# Patient Record
Sex: MALE | Race: WHITE
[De-identification: names, ages, dates, MRNs, and addresses within clinical notes are randomized per-mention and may not be internally consistent; named-entity substitution may affect disease eponyms.]

---

## 2017-10-25 ENCOUNTER — HOSPITAL ENCOUNTER (EMERGENCY)
Dept: HOSPITAL 62 - ER | Age: 42
Discharge: HOME | End: 2017-10-25
Payer: SELF-PAY

## 2017-10-25 VITALS — DIASTOLIC BLOOD PRESSURE: 80 MMHG | SYSTOLIC BLOOD PRESSURE: 120 MMHG

## 2017-10-25 DIAGNOSIS — F17.210: ICD-10-CM

## 2017-10-25 DIAGNOSIS — R07.89: Primary | ICD-10-CM

## 2017-10-25 LAB
ALBUMIN SERPL-MCNC: 4.4 G/DL (ref 3.5–5)
ALP SERPL-CCNC: 43 U/L (ref 38–126)
ALT SERPL-CCNC: 37 U/L (ref 21–72)
ANION GAP SERPL CALC-SCNC: 11 MMOL/L (ref 5–19)
AST SERPL-CCNC: 25 U/L (ref 17–59)
BASOPHILS # BLD AUTO: 0.1 10^3/UL (ref 0–0.2)
BASOPHILS NFR BLD AUTO: 1.1 % (ref 0–2)
BILIRUB DIRECT SERPL-MCNC: 0.3 MG/DL (ref 0–0.4)
BILIRUB SERPL-MCNC: 0.4 MG/DL (ref 0.2–1.3)
BUN SERPL-MCNC: 12 MG/DL (ref 7–20)
CALCIUM: 9.6 MG/DL (ref 8.4–10.2)
CHLORIDE SERPL-SCNC: 105 MMOL/L (ref 98–107)
CO2 SERPL-SCNC: 29 MMOL/L (ref 22–30)
CREAT SERPL-MCNC: 1 MG/DL (ref 0.52–1.25)
EOSINOPHIL # BLD AUTO: 0.5 10^3/UL (ref 0–0.6)
EOSINOPHIL NFR BLD AUTO: 6.8 % (ref 0–6)
ERYTHROCYTE [DISTWIDTH] IN BLOOD BY AUTOMATED COUNT: 13.6 % (ref 11.5–14)
GLUCOSE SERPL-MCNC: 82 MG/DL (ref 75–110)
HCT VFR BLD CALC: 44.1 % (ref 37.9–51)
HGB BLD-MCNC: 15.7 G/DL (ref 13.5–17)
HGB HCT DIFFERENCE: 3
LYMPHOCYTES # BLD AUTO: 2.6 10^3/UL (ref 0.5–4.7)
LYMPHOCYTES NFR BLD AUTO: 32.7 % (ref 13–45)
MCH RBC QN AUTO: 32.5 PG (ref 27–33.4)
MCHC RBC AUTO-ENTMCNC: 35.5 G/DL (ref 32–36)
MCV RBC AUTO: 92 FL (ref 80–97)
MONOCYTES # BLD AUTO: 1.1 10^3/UL (ref 0.1–1.4)
MONOCYTES NFR BLD AUTO: 13.5 % (ref 3–13)
NEUTROPHILS # BLD AUTO: 3.6 10^3/UL (ref 1.7–8.2)
NEUTS SEG NFR BLD AUTO: 45.9 % (ref 42–78)
POTASSIUM SERPL-SCNC: 5.1 MMOL/L (ref 3.6–5)
PROT SERPL-MCNC: 7.4 G/DL (ref 6.3–8.2)
RBC # BLD AUTO: 4.81 10^6/UL (ref 4.35–5.55)
SODIUM SERPL-SCNC: 144.9 MMOL/L (ref 137–145)
WBC # BLD AUTO: 7.9 10^3/UL (ref 4–10.5)

## 2017-10-25 PROCEDURE — 80053 COMPREHEN METABOLIC PANEL: CPT

## 2017-10-25 PROCEDURE — 85025 COMPLETE CBC W/AUTO DIFF WBC: CPT

## 2017-10-25 PROCEDURE — 71020: CPT

## 2017-10-25 PROCEDURE — 99285 EMERGENCY DEPT VISIT HI MDM: CPT

## 2017-10-25 PROCEDURE — 84484 ASSAY OF TROPONIN QUANT: CPT

## 2017-10-25 PROCEDURE — 36415 COLL VENOUS BLD VENIPUNCTURE: CPT

## 2017-10-25 PROCEDURE — 93005 ELECTROCARDIOGRAM TRACING: CPT

## 2017-10-25 PROCEDURE — 93010 ELECTROCARDIOGRAM REPORT: CPT

## 2017-10-25 NOTE — RADIOLOGY REPORT (SQ)
EXAM DESCRIPTION:  CHEST PA/LAT



COMPLETED DATE/TIME:  10/25/2017 4:05 pm



REASON FOR STUDY:  pain



COMPARISON:  None.



EXAM PARAMETERS:  NUMBER OF VIEWS: two views

TECHNIQUE: Digital Frontal and Lateral radiographic views of the chest acquired.

RADIATION DOSE: NA

LIMITATIONS: none



FINDINGS:  LUNGS AND PLEURA: No opacities, masses or pneumothorax. No pleural effusion.

MEDIASTINUM AND HILAR STRUCTURES: No masses or contour abnormalities.

HEART AND VASCULAR STRUCTURES: Heart normal size.  No evidence for failure.

BONES: No acute findings.

HARDWARE: None in the chest.

OTHER: No other significant finding.



IMPRESSION:  NO SIGNIFICANT RADIOGRAPHIC FINDING IN THE CHEST.



TECHNICAL DOCUMENTATION:  JOB ID:  [id number]

 2011 [employer]- All Rights Reserved

## 2017-10-25 NOTE — ER DOCUMENT REPORT
ED General





- General


Chief Complaint: Chest Pain > 30


Stated Complaint: CHEST PAIN


Time Seen by Provider: 10/25/17 15:29


Mode of Arrival: Ambulatory


Information source: Patient


Notes: 





41-year-old male presents with complaints of left-sided pain of the shoulder 

rating to his chest.  Patient notes the pain worsens with movement.  Patient 

denies any pain when he is not moving his shoulder.  Patient is very adamant 

that it is all related to movement and palpation


Patient denies any cardiac history denies any cardiac risk factors


TRAVEL OUTSIDE OF THE [place] IN LAST 30 DAYS: No





- HPI


Onset: Yesterday


Onset/Duration: Intermittent


Quality of pain: Achy


Severity: Mild


Pain Level: 1


Associated symptoms: Body/muscle aches


Exacerbated by: Movement


Relieved by: Denies


Similar symptoms previously: No


Recently seen / treated by doctor: No





- Related Data


Allergies/Adverse Reactions: 


 





No Known Allergies Allergy (Verified 10/25/17 14:44)


 











Past Medical History





- Social History


Smoking Status: Current Every Day Smoker


Cigarette use (# per day): Yes


Chew tobacco use (# tins/day): No


Smoking Education Provided: No


Frequency of alcohol use: None


Drug Abuse: None


Family History: Reviewed & Not Pertinent


Patient has suicidal ideation: No


Patient has homicidal ideation: No


Renal/ Medical History: Denies: Hx Peritoneal Dialysis


Surgical Hx: Negative





- Immunizations


Immunizations up to date: No


Hx Diphtheria, Pertussis, Tetanus Vaccination: Yes





Review of Systems





- Review of Systems


Notes: 





REVIEW OF SYSTEMS:


CONSTITUTIONAL :  Denies fever,  chills, or sweats.  Denies recent illness.


EENT:   Denies eye, ear, throat, or mouth pain or symptoms.  Denies nasal or 

sinus congestion or discharge.  Denies throat, tongue, or mouth swelling or 

difficulty swallowing.


CARDIOVASCULAR:  Denies chest pain.  Denies palpitations or racing or irregular 

heart beat.  Denies ankle edema.


RESPIRATORY:  Denies cough, cold, or chest congestion.  Denies shortness of 

breath, difficulty breathing, or wheezing.


GASTROINTESTINAL:  Denies abdominal pain or distention.  Denies nausea, vomiting

, or diarrhea.  Denies blood in vomitus, stools, or per rectum.  Denies black, 

tarry stools.  Denies constipation.  


GENITOURINARY:  Denies difficulty urinating, painful urination, burning, 

frequency, blood in urine, or discharge.


MUSCULOSKELETAL: Admits to pain with movement


SKIN:   Denies rash, lesions or sores.


HEMATOLOGIC :   Denies easy bruising or bleeding.


LYMPHATIC:  Denies swollen, enlarged glands.


NEUROLOGICAL:  Denies confusion or altered mental status.  Denies passing out 

or loss of consciousness.  Denies dizziness or lightheadedness.  Denies 

headache.  Denies weakness or paralysis or loss of use of either side.  Denies 

problems with gait or speech.  Denies sensory loss, numbness, or tingling.  

Denies seizures.


PSYCHIATRIC:  Denies anxiety or stress.  Denies depression, suicidal ideation, 

or homicidal ideation.





ALL OTHER SYSTEMS REVIEWED AND NEGATIVE.





Dictation was performed using Dragon voice recognition software 





PHYSICAL EXAMINATION:





GENERAL: Well-appearing, well-nourished and in no acute distress.





HEAD: Atraumatic, normocephalic.





EYES: Pupils equal round and reactive to light, extraocular movements intact, 

sclera anicteric, conjunctiva are normal.





ENT: Nares patent, oropharynx clear without exudates.  Moist mucous membranes.





NECK: Normal range of motion, supple without lymphadenopathy





LUNGS: Breath sounds clear to auscultation bilaterally and equal.  No wheezes 

rales or rhonchi.





HEART: Regular rate and rhythm without murmurs





ABDOMEN: Soft, nontender, nondistended abdomen.  No guarding, no rebound.  No 

masses appreciated.





Musculoskeletal: Tenderness on palpation of the left anterior chest wall





NEUROLOGICAL: Cranial nerves grossly intact.  Normal speech, normal gait.  

Normal sensory, motor exams 





PSYCH: Normal mood, normal affect.





SKIN: Warm, Dry, normal turgor, no rashes or lesions noted.





Physical Exam





- Vital signs


Vitals: 


 











Temp Pulse Resp BP Pulse Ox


 


 98.3 F   73   16   128/75 H  99 


 


 10/25/17 14:46  10/25/17 14:46  10/25/17 14:46  10/25/17 14:46  10/25/17 14:46














Course





- Re-evaluation


Re-evalutation: 





10/26/17 02:50


Patient's pain is completely reproducible on movement and palpation, he notes 

significant improvement of his pain at this time.  As a result I do not believe 

there is any cardiac abnormalities however the patient does promise that even 

though he will not stay in the hospital that he will see a cardiologist.


Therefore I will allow the patient to be discharged








After performing a Medical Screening Examination, I estimate there is LOW risk 

for RUPTURED ESOPHAGUS, PNEUMOTHORAX, PULMONARY EMBOLISM, ACUTE CORONARY 

SYNDROME, OR THORACIC AORTIC DISSECTION, thus I consider the discharge 

disposition reasonable.  I have reevaluated this patient multiple times and no 

significant life threatening changes are noted. The patient and I have 

discussed the diagnosis and risks, and we agree with discharging home with 

close follow-up. We also discussed returning to the Emergency Department 

immediately if new or worsening symptoms occur. We have discussed the symptoms 

which are most concerning (e.g., bloody sputum, worsening pain or shortness of 

breath) that necessitate immediate return.





- Vital Signs


Vital signs: 


 











Temp Pulse Resp BP Pulse Ox


 


 98.3 F   78   18   120/80   100 


 


 10/25/17 14:46  10/25/17 19:02  10/25/17 19:02  10/25/17 19:02  10/25/17 19:02














- Laboratory


Result Diagrams: 


 10/25/17 16:13





 10/25/17 16:13


Laboratory results interpreted by me: 


 











  10/25/17 10/25/17





  16:13 16:13


 


Monocytes %  13.5 H 


 


Eosinophils %  6.8 H 


 


Potassium   5.1 H














- Diagnostic Test


Radiology reviewed: Image reviewed, Reports reviewed





- EKG Interpretation by Me


EKG shows normal: Sinus rhythm, Axis, Intervals, QRS Complexes





Discharge





- Discharge


Clinical Impression: 


 Chest wall pain





Condition: Stable


Disposition: HOME, SELF-CARE


Instructions:  Anti-Inflammatory Medication ([facility]), Chest Wall Pain ([facility])


Additional Instructions: 


While your pain is reproducible today, you must return immediately if symptoms 

worsen or there are any other concerns


you must follow up with cardiologist for further work up


Prescriptions: 


Naproxen 500 mg PO BID #20 tablet


Referrals: 


CORRINA MADRID MD [ACTIVE STAFF] - Follow up tomorrow

## 2017-10-25 NOTE — ER DOCUMENT REPORT
ED Medical Screen (RME)





- General


Chief Complaint: Chest Pain > 30


Stated Complaint: CHEST PAIN


Time Seen by Provider: 10/25/17 15:29


Notes: 





Patient has had left-sided chest pain for 2 days.  No shortness of breath no 

nausea.  He states he has no chronic medical conditions.  States he has never 

seen a doctor.  Patient states the pain lasts for several minutes at a time and 

it is sharp when he gets it.  It seems to be related to movement especially of 

the left shoulder.  He denies any recent injuries.  No cough or congestion.


TRAVEL OUTSIDE OF THE [place] IN LAST 30 DAYS: No





- Related Data


Allergies/Adverse Reactions: 


 





No Known Allergies Allergy (Verified 10/25/17 14:44)


 











Past Medical History


Renal/ Medical History: Denies: Hx Peritoneal Dialysis





- Immunizations


Immunizations up to date: No





Physical Exam





- Vital signs


Vitals: 





 











Temp Pulse Resp BP Pulse Ox


 


 98.3 F   73   16   128/75 H  99 


 


 10/25/17 14:46  10/25/17 14:46  10/25/17 14:46  10/25/17 14:46  10/25/17 14:46














Course





- Vital Signs


Vital signs: 





 











Temp Pulse Resp BP Pulse Ox


 


 98.3 F   73   16   128/75 H  99 


 


 10/25/17 14:46  10/25/17 14:46  10/25/17 14:46  10/25/17 14:46  10/25/17 14:46

## 2018-10-01 ENCOUNTER — HOSPITAL ENCOUNTER (EMERGENCY)
Dept: HOSPITAL 62 - ER | Age: 43
Discharge: HOME | End: 2018-10-01
Payer: SELF-PAY

## 2018-10-01 VITALS — SYSTOLIC BLOOD PRESSURE: 157 MMHG | DIASTOLIC BLOOD PRESSURE: 89 MMHG

## 2018-10-01 DIAGNOSIS — K02.9: Primary | ICD-10-CM

## 2018-10-01 DIAGNOSIS — F17.200: ICD-10-CM

## 2018-10-01 DIAGNOSIS — K08.89: ICD-10-CM

## 2018-10-01 DIAGNOSIS — K05.10: ICD-10-CM

## 2018-10-01 PROCEDURE — 99282 EMERGENCY DEPT VISIT SF MDM: CPT

## 2018-10-01 NOTE — ER DOCUMENT REPORT
HPI





- HPI


Pain Level: 3


Notes: 





Patient is a 42 year-old male who presents to the ED complaining of left upper 

dental pain chronically to his upper teeth, but more so over the last few days.

  He has not noticed any obvious abscess or purulent discharge.  Patient is 

aware that all of his teeth are broken and he has bad decay and needs to see a 

dentist.  Patient states that he is still able to eat and drink, but does have 

a decreased p.o. intake due to the pain.  He has tried some over-the-counter 

meds with minimal relief.  No other concerns or complaints.  Denies any headache

, fever, head injury, neck pain, hoarseness, drooling, URI, sore throat, chest 

pain, palpitations, syncope, cough, shortness of breath, wheeze, dyspnea, 

abdominal pain, nausea/vomiting/diarrhea, urinary retention, dysuria, hematuria

, or rash.





- ROS


Systems Reviewed and Negative: Yes All other systems reviewed and negative





- CONSTITUTIONAL


Constitutional: DENIES: Fever, Chills





- EENT


EENT: DENIES: Sore Throat, Ear Pain, Eye problems





- NEURO


Neurology: DENIES: Headache, Weakness, Vision blurred, Dizzinesss / Vertigo





- CARDIOVASCULAR


Cardiovascular: DENIES: Chest pain





- RESPIRATORY


Respiratory: DENIES: Trouble Breathing, Coughing





- GASTROINTESTINAL


Gastrointestinal: DENIES: Abdominal Pain, Black / Bloody Stools





- REPRODUCTIVE


Reproductive: DENIES: Pregnant:





- MUSCULOSKELETAL


Musculoskeletal: DENIES: Extremity pain





Past Medical History





- Social History


Smoking Status: Current Every Day Smoker


Chew tobacco use (# tins/day): No


Frequency of alcohol use: None


Drug Abuse: None


Family History: Reviewed & Not Pertinent


Patient has suicidal ideation: No


Patient has homicidal ideation: No


Renal/ Medical History: Denies: Hx Peritoneal Dialysis





- Immunizations


Immunizations up to date: No


Hx Diphtheria, Pertussis, Tetanus Vaccination: Yes





Vertical Provider Document





- CONSTITUTIONAL


Agree With Documented VS: Yes


Notes: 





PHYSICAL EXAMINATION:





GENERAL: Well-appearing, well-nourished and in no acute distress.





HEAD: Atraumatic, normocephalic.





EYES: Pupils equal round and reactive to light, extraocular movements intact, 

sclera anicteric, conjunctiva are normal.





ENT: EAC clear b/l.  TM's intact b/l without erythema, fluid, or perforation.  

Nares patent and without discharge.  oropharynx clear without exudates.  No 

tonsilar hypertrophy or erythema.  Moist mucous membranes.  No sinus 

tenderness.  Uvula midline.  No palatine shift.  No tongue protrusion.  No 

respiratory compromise.





Mouth:  Poor dentition.  + Severe decay and mild gingivitis throughout the 

upper mouth.  No obvious abscess or discharge noted.  No facial swelling.  + 

mild tenderness to the gum line entire upper mouth.  + appearing periodontal 

disease.





NECK: Normal range of motion, supple without lymphadenopathy.  No rigidity/

meningismus.





LUNGS: Breath sounds clear to auscultation bilaterally and equal.  No wheezes 

rales or rhonchi.





HEART: Regular rate and rhythm without murmurs, rubs, gallops.





NEUROLOGICAL: Cranial nerves grossly intact.  Normal speech, normal gait.  





PSYCH: Normal mood, normal affect.





SKIN: Warm, Dry, normal turgor, no rashes or lesions noted.





- INFECTION CONTROL


TRAVEL OUTSIDE OF THE [place] IN LAST 30 DAYS: No





Course





- Re-evaluation


Re-evalutation: 





10/01/18 15:22


Patient is an afebrile, well-hydrated, 42-year-old male who presents to the ED 

with dental pain, suspect nerve root etiology versus infection.  Vitals are 

acceptable.  PE is otherwise unremarkable other than the very poor dentition 

and periodontal disease.  No I&D, labs, or imaging warranted at this time based 

on H&P.  Viscous lidocaine dispensed today.  I will send him home with a 

prescription for penicillin.  Low suspicion for any meningitis, sepsis, 

peritonsillar/pharyngeal abscess, respiratory compromise, Dayne's, temporal 

arteritis, or other emergent systemic condition at this time.  Patient is aware 

this condition can change from initial presentation and he needs to monitor 

symptoms closely.  Conservative measures otherwise for symptoms.  Call to 

schedule an appointment with a dentist for further evaluation and management.  

Recheck with your PCM this week as well.  Return to the ED with any worsening/

concerning symptoms otherwise as reviewed in discharge.  Patient is in 

agreement.





- Vital Signs


Vital signs: 


 











Temp Pulse Resp BP Pulse Ox


 


 98.4 F   73   16   157/89 H  98 


 


 10/01/18 15:07  10/01/18 15:07  10/01/18 15:07  10/01/18 15:07  10/01/18 15:07














Discharge





- Discharge


Clinical Impression: 


 Pain, dental





Condition: Stable


Disposition: HOME, SELF-CARE


Instructions:  Penicillin V K ([facility]), Toothache (Replaced by [facility])


Additional Instructions: 


Brush and floss twice daily


Maintain fluid intake


Take antibiotics as directed


Mouthwash, salt water gargles, peroxide rinse as needed


Tylenol/ibuprofen as needed


Recheck with PCM this week


Call today/tomorrow and schedule an appointment with your dentist for further 

evaluation


Return to the ED with any worsening symptoms and/or development of fever, 

headache, facial swelling, swelling of lips/tongue/throat, trouble swallowing, 

drooling, hoarseness, neck pain/stiffness, chest pain, palpitations, syncope, 

shortness of breath, trouble breathing, abdominal pain, n/v/d, numbness/tingling

, or other worsening symptoms that are concerning to you.


Prescriptions: 


Penicillin V Potassium [Penicillin Vk 250 mg Tablet] 500 mg PO BID #40 tablet


Forms:  Elevated Blood Pressure, Smoking Cessation Education


Referrals: 


[facility] [Provider Group] - Follow up in 1 week

## 2020-05-08 ENCOUNTER — HOSPITAL ENCOUNTER (EMERGENCY)
Dept: HOSPITAL 62 - ER | Age: 45
Discharge: HOME | End: 2020-05-08
Payer: SELF-PAY

## 2020-05-08 VITALS — SYSTOLIC BLOOD PRESSURE: 128 MMHG | DIASTOLIC BLOOD PRESSURE: 90 MMHG

## 2020-05-08 DIAGNOSIS — N20.0: Primary | ICD-10-CM

## 2020-05-08 DIAGNOSIS — F17.200: ICD-10-CM

## 2020-05-08 DIAGNOSIS — R10.9: ICD-10-CM

## 2020-05-08 LAB
ADD MANUAL DIFF: NO
ALBUMIN SERPL-MCNC: 4.5 G/DL (ref 3.5–5)
ALP SERPL-CCNC: 47 U/L (ref 38–126)
ANION GAP SERPL CALC-SCNC: 6 MMOL/L (ref 5–19)
APPEARANCE UR: CLEAR
APTT PPP: (no result) S
AST SERPL-CCNC: 29 U/L (ref 17–59)
BASOPHILS # BLD AUTO: 0 10^3/UL (ref 0–0.2)
BASOPHILS NFR BLD AUTO: 0.4 % (ref 0–2)
BILIRUB DIRECT SERPL-MCNC: 0 MG/DL (ref 0–0.4)
BILIRUB SERPL-MCNC: 0.5 MG/DL (ref 0.2–1.3)
BILIRUB UR QL STRIP: NEGATIVE
BUN SERPL-MCNC: 10 MG/DL (ref 7–20)
CALCIUM: 9.7 MG/DL (ref 8.4–10.2)
CHLORIDE SERPL-SCNC: 103 MMOL/L (ref 98–107)
CO2 SERPL-SCNC: 30 MMOL/L (ref 22–30)
EOSINOPHIL # BLD AUTO: 0.4 10^3/UL (ref 0–0.6)
EOSINOPHIL NFR BLD AUTO: 4.3 % (ref 0–6)
ERYTHROCYTE [DISTWIDTH] IN BLOOD BY AUTOMATED COUNT: 12.8 % (ref 11.5–14)
GLUCOSE SERPL-MCNC: 86 MG/DL (ref 75–110)
GLUCOSE UR STRIP-MCNC: NEGATIVE MG/DL
HCT VFR BLD CALC: 46.1 % (ref 37.9–51)
HGB BLD-MCNC: 16.6 G/DL (ref 13.5–17)
KETONES UR STRIP-MCNC: NEGATIVE MG/DL
LYMPHOCYTES # BLD AUTO: 1.7 10^3/UL (ref 0.5–4.7)
LYMPHOCYTES NFR BLD AUTO: 20.2 % (ref 13–45)
MCH RBC QN AUTO: 33.2 PG (ref 27–33.4)
MCHC RBC AUTO-ENTMCNC: 36.1 G/DL (ref 32–36)
MCV RBC AUTO: 92 FL (ref 80–97)
MONOCYTES # BLD AUTO: 1.2 10^3/UL (ref 0.1–1.4)
MONOCYTES NFR BLD AUTO: 14.3 % (ref 3–13)
NEUTROPHILS # BLD AUTO: 5 10^3/UL (ref 1.7–8.2)
NEUTS SEG NFR BLD AUTO: 60.8 % (ref 42–78)
NITRITE UR QL STRIP: NEGATIVE
PH UR STRIP: 7 [PH] (ref 5–9)
PLATELET # BLD: 355 10^3/UL (ref 150–450)
POTASSIUM SERPL-SCNC: 4.1 MMOL/L (ref 3.6–5)
PROT SERPL-MCNC: 8 G/DL (ref 6.3–8.2)
PROT UR STRIP-MCNC: NEGATIVE MG/DL
RBC # BLD AUTO: 5 10^6/UL (ref 4.35–5.55)
SP GR UR STRIP: 1.01
TOTAL CELLS COUNTED % (AUTO): 100 %
UROBILINOGEN UR-MCNC: NEGATIVE MG/DL (ref ?–2)
WBC # BLD AUTO: 8.2 10^3/UL (ref 4–10.5)

## 2020-05-08 PROCEDURE — 80053 COMPREHEN METABOLIC PANEL: CPT

## 2020-05-08 PROCEDURE — 99284 EMERGENCY DEPT VISIT MOD MDM: CPT

## 2020-05-08 PROCEDURE — 81001 URINALYSIS AUTO W/SCOPE: CPT

## 2020-05-08 PROCEDURE — 85025 COMPLETE CBC W/AUTO DIFF WBC: CPT

## 2020-05-08 PROCEDURE — 36415 COLL VENOUS BLD VENIPUNCTURE: CPT

## 2020-05-08 PROCEDURE — 74176 CT ABD & PELVIS W/O CONTRAST: CPT

## 2020-05-08 NOTE — ER DOCUMENT REPORT
ED Medical Screen (RME)





- General


Chief Complaint: Flank Pain


Stated Complaint: FLANK PAIN


Time Seen by Provider: 05/08/20 12:01


Mode of Arrival: Ambulatory


Information source: Patient


Notes: 





44-year-old male with no prior history presents emergency department with 

complaints of right flank pain since Wednesday.  Reports it was really bad 

Wednesday night, worse yesterday.  Denies history of kidney stones.  Denies 

fever vomiting diarrhea.  Patient right flank tender to palpate














I have greeted and performed a rapid initial assessment of this patient.  A 

comprehensive ED assessment and evaluation of the patient, analysis of test 

results and completion of the medical decision making process will be conducted 

by additional ED providers.


TRAVEL OUTSIDE OF THE [place] IN LAST 30 DAYS: No





- Related Data


Allergies/Adverse Reactions: 


                                        





No Known Allergies Allergy (Verified 10/25/17 14:44)


   











Past Medical History


Renal/ Medical History: Denies: Hx Peritoneal Dialysis





- Immunizations


Immunizations up to date: No


Hx Diphtheria, Pertussis, Tetanus Vaccination: Yes





Physical Exam





- Vital signs


Vitals: 





                                        











Temp Pulse Resp BP Pulse Ox


 


 98.6 F   79   18   144/83 H  98 


 


 05/08/20 11:59  05/08/20 11:59  05/08/20 11:59  05/08/20 11:59  05/08/20 11:59














Course





- Vital Signs


Vital signs: 





                                        











Temp Pulse Resp BP Pulse Ox


 


 98.6 F   79   18   144/83 H  98 


 


 05/08/20 11:59  05/08/20 11:59  05/08/20 11:59  05/08/20 11:59  05/08/20 11:59

## 2020-05-08 NOTE — ER DOCUMENT REPORT
ED GI/





- General


Chief Complaint: Flank Pain


Stated Complaint: FLANK PAIN


Time Seen by Provider: 05/08/20 12:01


Mode of Arrival: Ambulatory


Notes: 





CHIEF COMPLAINT: Right flank pain for 2 days





HPI: 44-year-old male presenting to the emergency department for evaluation of 

sudden onset of right flank pain for the last 2 days started in the lower back 

with radiation around the flank and abdomen towards the testicles.  Denies 

actual penile or testicular pain.  Does report some sensation of urinary urgency

at times.  No penile discharge.  No fever.  No vomiting but has had occasional 

nausea.  No history of kidney stones





ROS: See HPI - all other systems were reviewed and are otherwise negative


Constitutional: no fever 


Eyes: no drainage, no blurred vision


ENT: no runny nose, no sore throat


Cardiovascular:  no chest pain 


Resp: no SOB, no cough


GI: no vomiting, no diarrhea, positive  abdominal pain


: no dysuria, positive urgency


Integumentary: no rash 


Allergy: no hives 


Musculoskeletal: no extremity pain or swelling 


Neurological: no numbness/tingling, no weakness





MEDICATIONS: I agree with the patient medications as charted by the RN.





ALLERGIES: I agree with the allergies as charted by the RN.





PAST MEDICAL HISTORY/PAST SURGICAL HISTORY: Reviewed and agree as charted by RN.





SOCIAL HISTORY: Reviewed and agree as charted by RN.





FAMILY HISTORY: No significant familial comorbid conditions directly related to 

patient complaint





EXAM:


Reviewed vital signs as charted by RN.


CONSTITUTIONAL: Alert and oriented and responds appropriately to questions. 

Well-appearing; well-nourished


HEAD: Normocephalic; atraumatic


EYES: PERRL; Conjunctivae clear, sclerae non-icteric


ENT: normal nose; no rhinorrhea; moist mucous membranes; pharynx without lesions

noted


NECK: Supple without meningismus; non-tender; no cervical lymphadenopathy, no 

masses


CARD: RRR; no murmurs, no clicks, no rubs, no gallops; symmetric distal pulses


RESP: Normal chest excursion without splinting or tachypnea; breath sounds clear

and equal bilaterally; no wheezes, no rhonchi, no rales, pulse oximetry 98% on 

room air not hypoxic


ABD/GI: Normal bowel sounds; non-distended; soft, mild tenderness right lateral 

flank robert, no rebound, no guarding; no palpable organomegaly or masses.


BACK:  The back appears normal and is non-tender to palpation, there is mild 

right  CVA tenderness


EXT: Normal ROM in all joints; non-tender to palpation; no cyanosis, no 

effusions, no edema   


SKIN: Normal color for age and race; warm; dry; good turgor; no acute lesions 

noted


NEURO: Moves all extremities equally; Motor and sensory function intact 


PSYCH: The patient's mood and manner are appropriate. Grooming and personal 

hygiene are appropriate.





MDM: 44-year-old male with right flank pain for 2 days intermittent.  Suspect 

kidney stone.  I did review the patient's CT scan which was ordered in the 

triage process that it does appear he has a small kidney stone in the bladder.  

Will treat patient's discomfort awaiting lab work and urinalysis


TRAVEL OUTSIDE OF THE [place] IN LAST 30 DAYS: No





- Related Data


Allergies/Adverse Reactions: 


                                        





No Known Allergies Allergy (Verified 10/25/17 14:44)


   











Past Medical History





- General


Information source: Patient





- Social History


Smoking Status: Current Every Day Smoker


Family History: Reviewed & Not Pertinent


Patient has homicidal ideation: No


Renal/ Medical History: Denies: Hx Peritoneal Dialysis





- Immunizations


Immunizations up to date: No


Hx Diphtheria, Pertussis, Tetanus Vaccination: Yes





Physical Exam





- Vital signs


Vitals: 


                                        











Temp Pulse Resp BP Pulse Ox


 


 98.6 F   79   18   144/83 H  98 


 


 05/08/20 11:59  05/08/20 11:59  05/08/20 11:59  05/08/20 11:59  05/08/20 11:59














Course





- Re-evaluation


Re-evalutation: 





05/08/20 13:20


Patient is noted to have a 2.2 mm kidney stone on the right.  Discussed with the

patient at length.  Discussed return precautions.  Will give pain medicines in 

the ER prescribed pain medicines for home follow-up urology





- Vital Signs


Vital signs: 


                                        











Temp Pulse Resp BP Pulse Ox


 


 98.6 F   79   18   144/83 H  98 


 


 05/08/20 12:02  05/08/20 11:59  05/08/20 11:59  05/08/20 11:59  05/08/20 11:59














- Laboratory


Result Diagrams: 


                                 05/08/20 12:25





                                 05/08/20 12:25


Laboratory results interpreted by me: 


                                        











  05/08/20 05/08/20





  12:20 12:25


 


MCHC   36.1 H


 


Mono % (Auto)   14.3 H


 


Urine Blood  LARGE H 














Discharge





- Discharge


Clinical Impression: 


 Kidney stone on right side





Condition: Stable


Disposition: HOME, SELF-CARE


Instructions:  Kidney Stone ([facility])


Additional Instructions: 


1. return to the ED for any fever, back pain or worsening condition


2. hydrate well at home to flush the system.


3. Percocet for pain, no driving if taking Percocet for pain


4. follow up with Urology for further evaluation and treatment





Prescriptions: 


Tamsulosin HCl [Flomax 0.4 mg Cap.sr] 0.4 mg PO DAILY #7 cap.sr.24h


Oxycodone HCl/Acetaminophen [Percocet 5-325 mg Tablet] 1 tab PO Q4H PRN #15 tab


 PRN Reason: 


Ondansetron [Zofran Odt 4 mg Tablet] 1 - 2 tab PO Q4H PRN #15 tab.rapdis


 PRN Reason: For Nausea/Vomiting


Referrals: 


WANDA RAMIREZ MD [NO LOCAL MD] - Follow up as needed

## 2020-05-08 NOTE — RADIOLOGY REPORT (SQ)
EXAM DESCRIPTION:  CT ABD/PELVIS NO ORAL OR IV



IMAGES COMPLETED DATE/TIME:  5/8/2020 12:40 pm



REASON FOR STUDY:  right flank pain



COMPARISON:  None.



TECHNIQUE:  CT scan of the abdomen and pelvis performed without intravenous or oral contrast. Images 
reviewed with lung, soft tissue, and bone windows. Reconstructed coronal and sagittal MPR images revi
ewed. All images stored on PACS.

All CT scanners at this facility use dose modulation, iterative reconstruction, and/or weight based d
osing when appropriate to reduce radiation dose to as low as reasonably achievable (ALARA).

[facility]: Dose Right  [facility]: CareDose    [facility]: Dose Right    [facility]: Teradose 4D    [facility]: Smart Z[employer]anclub



RADIATION DOSE:  CT Rad equipment meets quality standard of care and radiation dose reduction techniq
ues were employed. CTDIvol: 5.6 mGy. DLP: 303 mGy-cm.mGy.



LIMITATIONS:  None.



FINDINGS:  LOWER CHEST: No significant findings. No nodules or infiltrates.

NON-CONTRASTED LIVER, SPLEEN, ADRENALS: Evaluation limited by lack of IV contrast. No identified sign
ificant masses.

PANCREAS: No masses. No peripancreatic inflammatory changes.

GALLBLADDER: No identified stones by CT criteria. No inflammatory changes to suggest cholecystitis.

RIGHT KIDNEY AND URETER: No suspicious masses. Assessment limited by lack of IV contrast.   2.2 mm ri
ght UVJ stone.   Mild hydronephrosis and perinephric stranding.

LEFT KIDNEY AND URETER: No suspicious masses. Assessment limited by lack of IV contrast.   No signifi
cant calcifications.   No hydronephrosis or hydroureter.

AORTA AND RETROPERITONEUM: No aneurysm. No retroperitoneal masses or adenopathy.

BOWEL AND PERITONEAL CAVITY: Scattered diverticuli.  No acute diverticulitis.

APPENDIX: Normal.

PELVIS, BLADDER, AND ABDOMINAL WALL:No abnormal masses. No free fluid. Bladder normal.

BONES: No significant findings.

OTHER: No other significant finding.



IMPRESSION:  2.2 mm right UVJ stone with mild right-sided hydronephrosis and perinephric stranding.



COMMENT:  Quality ID # [id number]: Final reports with documentation of one or more dose reduction techniques
 (e.g., Automated exposure control, adjustment of the mA and/or kV according to patient size, use of 
iterative reconstruction technique)



TECHNICAL DOCUMENTATION:  JOB ID:  [id number]

 [employer]- All Rights Reserved



Reading location - IP/workstation name: [email or web address]